# Patient Record
Sex: MALE | Race: WHITE | Employment: OTHER | URBAN - NONMETROPOLITAN AREA
[De-identification: names, ages, dates, MRNs, and addresses within clinical notes are randomized per-mention and may not be internally consistent; named-entity substitution may affect disease eponyms.]

---

## 2019-03-04 ENCOUNTER — HOSPITAL ENCOUNTER (OUTPATIENT)
Age: 54
Discharge: HOME | End: 2019-03-04

## 2019-03-04 LAB
EKG ATRIAL RATE: 93 BPM
EKG P AXIS: 57 DEGREES
EKG P-R INTERVAL: 158 MS
EKG Q-T INTERVAL: 360 MS
EKG QRS DURATION: 92 MS
EKG QTC CALCULATION (BAZETT): 447 MS
EKG R AXIS: 61 DEGREES
EKG T AXIS: 32 DEGREES
EKG VENTRICULAR RATE: 93 BPM

## 2019-03-04 PROCEDURE — 93005 ELECTROCARDIOGRAM TRACING: CPT

## 2019-05-15 ENCOUNTER — OFFICE VISIT (OUTPATIENT)
Dept: CARDIOLOGY | Age: 54
End: 2019-05-15

## 2019-05-15 ENCOUNTER — HOSPITAL ENCOUNTER (OUTPATIENT)
Dept: NON INVASIVE DIAGNOSTICS | Age: 54
Discharge: HOME | End: 2019-05-15

## 2019-05-15 VITALS
HEIGHT: 69 IN | DIASTOLIC BLOOD PRESSURE: 107 MMHG | BODY MASS INDEX: 33.86 KG/M2 | HEART RATE: 79 BPM | SYSTOLIC BLOOD PRESSURE: 207 MMHG | OXYGEN SATURATION: 99 % | RESPIRATION RATE: 17 BRPM | WEIGHT: 228.6 LBS

## 2019-05-15 DIAGNOSIS — E66.9 OBESITY (BMI 30.0-34.9): ICD-10-CM

## 2019-05-15 DIAGNOSIS — I10 ESSENTIAL HYPERTENSION: ICD-10-CM

## 2019-05-15 DIAGNOSIS — G47.33 OSA (OBSTRUCTIVE SLEEP APNEA): ICD-10-CM

## 2019-05-15 DIAGNOSIS — I10 UNCONTROLLED HYPERTENSION: Primary | ICD-10-CM

## 2019-05-15 LAB
LV EF: 65 %
LVEF MODALITY: NORMAL

## 2019-05-15 PROCEDURE — 99243 OFF/OP CNSLTJ NEW/EST LOW 30: CPT | Performed by: INTERNAL MEDICINE

## 2019-05-15 PROCEDURE — 93306 TTE W/DOPPLER COMPLETE: CPT

## 2019-05-15 RX ORDER — CARVEDILOL 12.5 MG/1
12.5 TABLET ORAL 2 TIMES DAILY
Qty: 180 TABLET | Refills: 3 | Status: SHIPPED | OUTPATIENT
Start: 2019-05-15 | End: 2020-07-13 | Stop reason: SDUPTHER

## 2019-05-15 RX ORDER — LISINOPRIL 20 MG/1
20 TABLET ORAL DAILY
COMMUNITY
End: 2019-05-15

## 2019-05-15 RX ORDER — METOPROLOL SUCCINATE 25 MG/1
25 TABLET, EXTENDED RELEASE ORAL DAILY
COMMUNITY
End: 2019-05-15

## 2019-05-15 RX ORDER — LOSARTAN POTASSIUM AND HYDROCHLOROTHIAZIDE 25; 100 MG/1; MG/1
1 TABLET ORAL DAILY
Qty: 90 TABLET | Refills: 3 | Status: SHIPPED | OUTPATIENT
Start: 2019-05-15 | End: 2019-08-23 | Stop reason: ALTCHOICE

## 2019-05-15 RX ORDER — CLONIDINE HYDROCHLORIDE 0.2 MG/1
0.2 TABLET ORAL 3 TIMES DAILY PRN
COMMUNITY
End: 2020-04-27

## 2019-05-15 NOTE — PROGRESS NOTES
Bella Valentine am scribing for and in the presence of Mikhail Garcia MD.    Patient: Mili Romero  : 1965  Date of Visit: May 15, 2019    REASON FOR VISIT / CONSULTATION: New Patient (Referral from TASIA Vernon. HX: HTN. Patient states he feels good. SOB while running up steps. palpitations every once in a while-do not last long. Denies: CP, lightheaded/dizziness)      History of Present Illness:        Dear TERESA Graves CNP    I had the pleasure of seeing Mili Romero in my office today. Mr. [place] is a 47 y.o. male with a history of difficult to control hypertension. He has had hypertension for several years. He started on medications 3-4 months ago. He does have a family history of hypertension in father. History of heart disease in his father. He denies any history of diabetes. He never has been a smoker. Risk factors: hypertension, sleep apnea, obesity. Mr. [place] is here as a referral from TERESA Graves CNP for hypertension. Every once in a while he will feel flutters in his chest.  Denies any chest pain, pressure or tightness. He does have headaches once a while. No change in his vision, no blurring or double vision. He has been trying to lose weight. He does have clinical history suggestive of obstructive sleep apnea syndrome. He has poor sleep, snoring and history of witnessed apneic episodes. 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 He does activities of daily living and he lives by himself. He drinks 1 cup of caffeine a day. He does drink 3-4 nights a week with drinking a couple drinks before bed. He denies any chest pain now or in the recent past, increased shortness of breath, abdominal pain, bleeding problems, problems with his medications or any other concerns at this time. ECG today showed sinus rhythm, no acute ischemic changes. Patient said he is taking clonidine as needed.   He has his blood pressure log and is not as bad as his blood pressure in the office today. Usually running in the 903X systolic with a few readings as low as 1 16 mmHg. PAST MEDICAL HISTORY:      No past medical history on file. CURRENT ALLERGIES: Patient has no known allergies. REVIEW OF SYSTEMS: 14 systems were reviewed. Pertinent positives and negatives as above, all else negative. No past surgical history on file. Social History:  Social History     Tobacco Use    Smoking status: Never Smoker    Smokeless tobacco: Never Used   Substance Use Topics    Alcohol use: Not on file    Drug use: Not on file        CURRENT MEDICATIONS:        Outpatient Medications Marked as Taking for the 5/15/19 encounter (Office Visit) with Shad Enriquez MD   Medication Sig Dispense Refill    cloNIDine (CATAPRES) 0.2 MG tablet Take 0.2 mg by mouth 3 times daily as needed      carvedilol (COREG) 12.5 MG tablet Take 1 tablet by mouth 2 times daily 180 tablet 3    losartan-hydrochlorothiazide (HYZAAR) 100-25 MG per tablet Take 1 tablet by mouth daily 90 tablet 3       FAMILY HISTORY: History of hypertension and his father. His father also has heart disease in his early 62s. Physical Examination:      BP (!) 207/107 (Site: Left Upper Arm, Position: Sitting, Cuff Size: Large Adult)   Pulse 79   Resp 17   Ht 5' 9\" (1.753 m)   Wt 228 lb 9.6 oz (103.7 kg)   SpO2 99%   BMI 33.76 kg/m²  Body mass index is 33.76 kg/m². Constitutional: He is oriented to person. He appears well-developed and well-nourished. In no acute distress. HEENT: Normocephalic and atraumatic. No JVD present. Carotid bruit is not present. No mass and no thyromegaly present. No lymphadenopathy present. Cardiovascular: Normal rate, regular rhythm, normal heart sounds. Exam reveals no gallop and no friction rubs. No murmur was heard. .   Pulmonary/Chest: Effort normal and breath sounds normal. No respiratory distress. He has no wheezes, rhonchi or rales.  Abdominal: Soft, to hold off. Obesity: Body mass index is 33.76 kg/m². I also briefly discussed both diet and exercise strategies for him to continue to loses weight and he was very receptive to this. In the meantime, I encouraged Mr. Gloria Vazquez to continue to take his other medications. FOLLOW UP:   I told Mr. Gloria Vazquez to call my office if he had any problems, but otherwise I asked him to Return in about 2 weeks (around 5/29/2019). However, I would be happy to see him sooner should the need arise. Sincerely,  Edgard Hou MD, F.A.C.C. [facility] Cardiology Specialist    [address] [place] [address]  Phone: [phone], Fax: [phone]     I believe that the risk of significant morbidity and mortality related to the patient's current medical conditions are: Intermediate. 40 minutes were spent with the patient and all of their questions were answered. The documentation recorded by the scribe, accurately and completely reflects the services I personally performed and the decisions made by me. Edgard Hou MD, F.A.C.C.  May 15, 2019

## 2019-05-15 NOTE — PATIENT INSTRUCTIONS
SURVEY:    You may be receiving a survey from [email or web address] regarding your visit today. Please complete the survey to enable us to provide the highest quality of care to you and your family. If you cannot score us a very good on any question, please call the office to discuss how we could have made your experience a very good one. Thank you.

## 2019-05-16 ENCOUNTER — TELEPHONE (OUTPATIENT)
Dept: CARDIOLOGY | Age: 54
End: 2019-05-16

## 2019-05-16 NOTE — TELEPHONE ENCOUNTER
----- Message from Terri Tobias MD sent at 5/15/2019  6:36 PM EDT -----  Normal heart function. We'll discuss more details on follow-up.

## 2019-05-29 ENCOUNTER — OFFICE VISIT (OUTPATIENT)
Dept: CARDIOLOGY | Age: 54
End: 2019-05-29

## 2019-05-29 VITALS
WEIGHT: 218 LBS | HEIGHT: 69 IN | OXYGEN SATURATION: 98 % | BODY MASS INDEX: 32.29 KG/M2 | SYSTOLIC BLOOD PRESSURE: 111 MMHG | HEART RATE: 80 BPM | DIASTOLIC BLOOD PRESSURE: 77 MMHG | RESPIRATION RATE: 18 BRPM

## 2019-05-29 DIAGNOSIS — I10 ESSENTIAL HYPERTENSION: Primary | ICD-10-CM

## 2019-05-29 DIAGNOSIS — E66.9 CLASS 1 OBESITY WITHOUT SERIOUS COMORBIDITY WITH BODY MASS INDEX (BMI) OF 32.0 TO 32.9 IN ADULT, UNSPECIFIED OBESITY TYPE: ICD-10-CM

## 2019-05-29 DIAGNOSIS — G47.33 OSA (OBSTRUCTIVE SLEEP APNEA): ICD-10-CM

## 2019-05-29 PROCEDURE — 99213 OFFICE O/P EST LOW 20 MIN: CPT | Performed by: INTERNAL MEDICINE

## 2019-05-29 NOTE — PATIENT INSTRUCTIONS
SURVEY:    You may be receiving a survey from [employer] regarding your visit today. Please complete the survey to enable us to provide the highest quality of care to you and your family. If you cannot score us a very good on any question, please call the office to discuss how we could have made your experience a very good one. Thank you.

## 2019-05-29 NOTE — PROGRESS NOTES
Tacho Billings am scribing for and in the presence of Digna Loomis MD.    Patient: Ngozi Mccartney  : 1965  Date of Visit: May 29, 2019    REASON FOR VISIT / CONSULTATION: Follow-up (HX: HTN, SALVADOR. Echo 5/15. Pt states he is doing well. Denies: CP, Palpitaitons, Lightheaded/dizziness, SOB. )      History of Present Illness:        Dear TERESA Chaney CNP    I had the pleasure of seeing Ngozi Mccartney in my office today. Mr. Elia Lagos is a 47 y.o. male with a history of difficult to control hypertension. He has had hypertension for several years. He started on medications 3-4 months ago. He does have a family history of hypertension in father. History of heart disease in his father. He denies any history of diabetes. He never has been a smoker. Risk factors: hypertension, sleep apnea, obesity. Mr. Elia Lagos was a referral from TERESA Chaney CNP for hypertension. He does have clinical history suggestive of obstructive sleep apnea syndrome. He has poor sleep, snoring and history of witnessed apneic episodes. He never had sleep apnea test done. 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 He drinks 1 cup of caffeine a day. He does drink 3-4 nights a week with drinking a couple drinks before bed. Echo done on 5/15/2019- EF >65%. The LV wall thickness is moderately increased. The left atrium is mildly dilated (29-33) with a left atrral volume index of 30 ml/m2. Mild aortic root is mildly dilated when corrected for body surface area. Evidence of mild (grade I) diastolic dysfunction is seen. Is here today for follow-up. Doing really well. His blood pressure is controlled. No problems with medication. No chest pain, pressure or tightness. No significant shortness of breath. No palpitations or dizziness. He is doing good activity wise, he also lost 10 pounds since last visit.     PAST MEDICAL HISTORY:        Past Medical History:   Diagnosis Date    H/O echocardiogram 05/15/2019    EF >65%. The LV wall thickness is moderately increased. The left atrium is mildly dilated (29-33) with a left atrral volume index of 30 ml/m2. Mild aortic root is mildly dilated when corrected for body surface area. Evidence of mild (grade I) diastolic dysfunction is seen. CURRENT ALLERGIES: Patient has no known allergies. REVIEW OF SYSTEMS: 14 systems were reviewed. Pertinent positives and negatives as above, all else negative. No past surgical history on file. Social History:  Social History     Tobacco Use    Smoking status: Never Smoker    Smokeless tobacco: Never Used   Substance Use Topics    Alcohol use: Not on file    Drug use: Not on file        CURRENT MEDICATIONS:        Outpatient Medications Marked as Taking for the 5/29/19 encounter (Office Visit) with Bronwyn Mejia MD   Medication Sig Dispense Refill    cloNIDine (CATAPRES) 0.2 MG tablet Take 0.2 mg by mouth 3 times daily as needed      carvedilol (COREG) 12.5 MG tablet Take 1 tablet by mouth 2 times daily 180 tablet 3    losartan-hydrochlorothiazide (HYZAAR) 100-25 MG per tablet Take 1 tablet by mouth daily 90 tablet 3       FAMILY HISTORY: History of hypertension and his father. His father also has heart disease in his early 62s. Physical Examination:      /77 (Site: Right Upper Arm, Position: Sitting, Cuff Size: Medium Adult)   Pulse 80   Resp 18   Ht 5' 9\" (1.753 m)   Wt 218 lb (98.9 kg)   SpO2 98%   BMI 32.19 kg/m²  Body mass index is 32.19 kg/m². Constitutional: He is oriented to person. He appears well-developed and well-nourished. In no acute distress. HEENT: Normocephalic and atraumatic. No JVD present. Carotid bruit is not present. No mass and no thyromegaly present. No lymphadenopathy present. Cardiovascular: Normal rate, regular rhythm, normal heart sounds. Exam reveals no gallop and no friction rubs.  2/6 systolic murmur, 2nd intercostal space on the LEFT just lateral to the sternum. Pulmonary/Chest: Effort normal and breath sounds normal. No respiratory distress. He has no wheezes, rhonchi or rales. Abdominal: Soft, non-tender. Bowel sounds and aorta are normal. He exhibits no organomegaly, mass or bruit. Extremities: None. No cyanosis or clubbing. 2+ radial and carotid pulses. Distal extremity pulses: 2+ bilaterally. .  Neurological: He is alert and oriented to person. No evidence of gross cranial nerve deficit. Coordination appeared normal.   Skin: Skin is warm and dry. There is no rash or diaphoresis. Psychiatric: He has a normal mood and affect. His speech is normal and behavior is normal.      MOST RECENT LABS ON RECORD:   No results found for: WBC, HGB, HCT, PLT, CHOL, TRIG, HDL, LDLDIRECT, ALT, AST, NA, K, CL, CREATININE, BUN, CO2, TSH, PSA, INR, GLUF, LABA1C, LABMICR, BNP    ASSESSMENT:     1. Essential hypertension    2. Class 1 obesity without serious comorbidity with body mass index (BMI) of 32.0 to 32.9 in adult, unspecified obesity type    3. SALVADOR (obstructive sleep apnea)       PLAN:         Essential Hypertension: Controlled   · ACE Inibitor/ARB: Change lisinopril to losartan hydrochlorothiazide 100/25 mg daily. · Beta Blocker Therapy: Continue Carvedilol  12.5 mg  twice daily I discussed the potential side effects of this medication including lightheadedness and dizziness and told him to call the office if this occurs. Obesity: Body mass index is 32.19 kg/m²., Mr. Ho Mayo has lost about 10 lbs since last visit. I also briefly discussed both diet and exercise strategies for him to continue to loses weight and he was very receptive to this. I let him know that when he is ready he can call our office and we can set his sleep study up for him. In the meantime, I encouraged Mr. Ho Mayo to continue to take his other medications.      FOLLOW UP:   I told Mr. Ho Mayo to call my office if he had any problems, but otherwise I asked him to Return in about 1 year (around 5/29/2020). However, I would be happy to see him sooner should the need arise. Sincerely,  Jany James MD, F.A.C.C. [facility] Cardiology Specialist    [address] [place] [address] [facility], [address] [place] [address]  Phone: [phone], Fax: [phone]     I believe that the risk of significant morbidity and mortality related to the patient's current medical conditions are: low-intermediate. The documentation recorded by the scribe, accurately and completely reflects the services I personally performed and the decisions made by me. Jany James MD, F.A.C.C.  May 29, 2019

## 2019-08-14 ENCOUNTER — TELEPHONE (OUTPATIENT)
Dept: CARDIOLOGY | Age: 54
End: 2019-08-14

## 2019-08-14 NOTE — TELEPHONE ENCOUNTER
Robert's daughter, Nolan Todd, called to report Kathy has had low pressure x 1 month along with dizziness. BP is high before taking meds in a.m. Only taking 1 carvedilol daily, not 2 as prescribed. 90/50 is average bp daily     Please advise. Thank you!

## 2019-08-23 ENCOUNTER — OFFICE VISIT (OUTPATIENT)
Dept: CARDIOLOGY | Age: 54
End: 2019-08-23

## 2019-08-23 VITALS — DIASTOLIC BLOOD PRESSURE: 107 MMHG | HEART RATE: 76 BPM | SYSTOLIC BLOOD PRESSURE: 167 MMHG

## 2019-08-23 DIAGNOSIS — I10 ESSENTIAL HYPERTENSION: Primary | ICD-10-CM

## 2019-08-23 PROCEDURE — 99999 PR OFFICE/OUTPT VISIT,PROCEDURE ONLY: CPT | Performed by: INTERNAL MEDICINE

## 2019-08-23 RX ORDER — LISINOPRIL 10 MG/1
10 TABLET ORAL DAILY
COMMUNITY
End: 2019-11-08 | Stop reason: SDUPTHER

## 2019-08-23 NOTE — PATIENT INSTRUCTIONS
SURVEY:    You may be receiving a survey from [employer] regarding your visit today. Please complete the survey to enable us to provide the highest quality of care to you and your family. If you cannot score us a very good on any question, please call the office to discuss how we could have made your experience a very good one. Thank you.

## 2019-11-08 RX ORDER — LISINOPRIL 10 MG/1
10 TABLET ORAL DAILY
Qty: 90 TABLET | Refills: 3 | Status: SHIPPED | OUTPATIENT
Start: 2019-11-08 | End: 2019-11-27 | Stop reason: DRUGHIGH

## 2019-11-27 RX ORDER — LISINOPRIL 20 MG/1
20 TABLET ORAL DAILY
Qty: 90 TABLET | Refills: 3 | Status: SHIPPED | OUTPATIENT
Start: 2019-11-27 | End: 2020-07-13 | Stop reason: SDUPTHER

## 2020-04-27 ENCOUNTER — APPOINTMENT (OUTPATIENT)
Dept: GENERAL RADIOLOGY | Age: 55
End: 2020-04-27

## 2020-04-27 ENCOUNTER — HOSPITAL ENCOUNTER (EMERGENCY)
Age: 55
Discharge: HOME | End: 2020-04-27
Attending: EMERGENCY MEDICINE

## 2020-04-27 VITALS
RESPIRATION RATE: 16 BRPM | HEIGHT: 69 IN | OXYGEN SATURATION: 99 % | BODY MASS INDEX: 29.62 KG/M2 | WEIGHT: 200 LBS | HEART RATE: 92 BPM | SYSTOLIC BLOOD PRESSURE: 199 MMHG | DIASTOLIC BLOOD PRESSURE: 120 MMHG | TEMPERATURE: 98.4 F

## 2020-04-27 PROCEDURE — 73130 X-RAY EXAM OF HAND: CPT

## 2020-04-27 PROCEDURE — 12002 RPR S/N/AX/GEN/TRNK2.6-7.5CM: CPT

## 2020-04-27 PROCEDURE — 90715 TDAP VACCINE 7 YRS/> IM: CPT | Performed by: EMERGENCY MEDICINE

## 2020-04-27 PROCEDURE — 90471 IMMUNIZATION ADMIN: CPT | Performed by: EMERGENCY MEDICINE

## 2020-04-27 PROCEDURE — 2500000003 HC RX 250 WO HCPCS: Performed by: EMERGENCY MEDICINE

## 2020-04-27 PROCEDURE — 99282 EMERGENCY DEPT VISIT SF MDM: CPT

## 2020-04-27 PROCEDURE — 6360000002 HC RX W HCPCS: Performed by: EMERGENCY MEDICINE

## 2020-04-27 RX ORDER — CEPHALEXIN 500 MG/1
500 CAPSULE ORAL 4 TIMES DAILY
Qty: 28 CAPSULE | Refills: 0 | Status: SHIPPED | OUTPATIENT
Start: 2020-04-27 | End: 2020-05-04

## 2020-04-27 RX ORDER — LIDOCAINE HYDROCHLORIDE 10 MG/ML
20 INJECTION, SOLUTION INFILTRATION; PERINEURAL ONCE
Status: COMPLETED | OUTPATIENT
Start: 2020-04-27 | End: 2020-04-27

## 2020-04-27 RX ADMIN — TETANUS TOXOID, REDUCED DIPHTHERIA TOXOID AND ACELLULAR PERTUSSIS VACCINE, ADSORBED 0.5 ML: 5; 2.5; 8; 8; 2.5 SUSPENSION INTRAMUSCULAR at 13:54

## 2020-04-27 RX ADMIN — LIDOCAINE HYDROCHLORIDE 20 ML: 10 INJECTION, SOLUTION INFILTRATION; PERINEURAL at 14:40

## 2020-04-27 ASSESSMENT — PAIN DESCRIPTION - PAIN TYPE: TYPE: ACUTE PAIN

## 2020-04-27 ASSESSMENT — PAIN DESCRIPTION - ORIENTATION: ORIENTATION: LEFT

## 2020-04-27 ASSESSMENT — PAIN DESCRIPTION - LOCATION: LOCATION: HAND

## 2020-04-27 ASSESSMENT — PAIN SCALES - GENERAL
PAINLEVEL_OUTOF10: 8
PAINLEVEL_OUTOF10: 8

## 2020-04-27 NOTE — ED PROVIDER NOTES
Acute Type of Exam: Initial FINDINGS: No acute fracture. No radiopaque foreign body. Narrowing of the interphalangeal joint spaces. No radiopaque foreign body. LABS:  No results found for this visit on 04/27/20.    none    EMERGENCY DEPARTMENT COURSE:   Vitals:    Vitals:    04/27/20 1341   BP: (!) 199/120   Pulse: 92   Resp: 16   Temp: 98.4 °F (36.9 °C)   TempSrc: Oral   SpO2: 99%   Weight: 90.7 kg (200 lb)   Height: 5' 9\" (1.753 m)     -------------------------  BP: (!) 199/120, Temp: 98.4 °F (36.9 °C), Pulse: 92, Resp: 16      RE-EVALUATION:      CONSULTS:  none    PROCEDURES:  Lac Repair  Date/Time: 4/27/2020 3:03 PM  Performed by: Avery Chang MD  Authorized by: Avery Chang MD     Consent:     Consent obtained:  Verbal    Consent given by:  Patient    Risks discussed:  Infection, pain, retained foreign body, need for additional repair, poor cosmetic result and poor wound healing    Alternatives discussed:  No treatment  Anesthesia (see MAR for exact dosages):      Anesthesia method:  Local infiltration    Local anesthetic:  Lidocaine 1% w/o epi  Laceration details:     Location:  Hand    Hand location:  L palm    Length (cm):  3    Depth (mm):  3  Repair type:     Repair type:  Simple  Pre-procedure details:     Preparation:  Patient was prepped and draped in usual sterile fashion  Exploration:     Hemostasis achieved with:  Direct pressure    Wound exploration: wound explored through full range of motion      Wound extent: no foreign bodies/material noted      Contaminated: no    Treatment:     Area cleansed with:  Betadine    Amount of cleaning:  Standard    Irrigation solution:  Sterile saline    Irrigation method:  Syringe    Visualized foreign bodies/material removed: no    Skin repair:     Repair method:  Sutures    Suture size:  3-0    Suture material:  Nylon    Number of sutures:  6  Approximation:     Approximation:  Close  Post-procedure details:     Dressing:  Adhesive bandage and

## 2020-07-13 RX ORDER — CARVEDILOL 12.5 MG/1
12.5 TABLET ORAL 2 TIMES DAILY
Qty: 28 TABLET | Refills: 0 | Status: SHIPPED | OUTPATIENT
Start: 2020-07-13 | End: 2020-07-31 | Stop reason: SDUPTHER

## 2020-07-13 RX ORDER — LISINOPRIL 20 MG/1
20 TABLET ORAL DAILY
Qty: 14 TABLET | Refills: 0 | Status: SHIPPED | OUTPATIENT
Start: 2020-07-13 | End: 2020-07-31 | Stop reason: SDUPTHER

## 2020-07-31 ENCOUNTER — OFFICE VISIT (OUTPATIENT)
Dept: CARDIOLOGY | Age: 55
End: 2020-07-31

## 2020-07-31 ENCOUNTER — HOSPITAL ENCOUNTER (OUTPATIENT)
Age: 55
Discharge: HOME | End: 2020-07-31

## 2020-07-31 VITALS
HEART RATE: 63 BPM | WEIGHT: 229 LBS | DIASTOLIC BLOOD PRESSURE: 127 MMHG | RESPIRATION RATE: 16 BRPM | BODY MASS INDEX: 33.92 KG/M2 | OXYGEN SATURATION: 99 % | SYSTOLIC BLOOD PRESSURE: 216 MMHG | HEIGHT: 69 IN

## 2020-07-31 LAB
ANION GAP SERPL CALCULATED.3IONS-SCNC: 5 MMOL/L (ref 9–17)
BUN BLDV-MCNC: 11 MG/DL (ref 6–20)
BUN/CREAT BLD: 12 (ref 9–20)
CALCIUM SERPL-MCNC: 9.5 MG/DL (ref 8.6–10.4)
CHLORIDE BLD-SCNC: 103 MMOL/L (ref 98–107)
CHOLESTEROL/HDL RATIO: 5.3
CHOLESTEROL: 192 MG/DL
CO2: 28 MMOL/L (ref 20–31)
CREAT SERPL-MCNC: 0.93 MG/DL (ref 0.7–1.2)
GFR AFRICAN AMERICAN: >60 ML/MIN
GFR NON-AFRICAN AMERICAN: >60 ML/MIN
GFR SERPL CREATININE-BSD FRML MDRD: ABNORMAL ML/MIN/{1.73_M2}
GFR SERPL CREATININE-BSD FRML MDRD: ABNORMAL ML/MIN/{1.73_M2}
GLUCOSE BLD-MCNC: 104 MG/DL (ref 70–99)
HCT VFR BLD CALC: 46 % (ref 40.7–50.3)
HDLC SERPL-MCNC: 36 MG/DL
HEMOGLOBIN: 15.4 G/DL (ref 13–17)
LDL CHOLESTEROL: 114 MG/DL (ref 0–130)
MCH RBC QN AUTO: 30.4 PG (ref 25.2–33.5)
MCHC RBC AUTO-ENTMCNC: 33.5 G/DL (ref 28.4–34.8)
MCV RBC AUTO: 90.7 FL (ref 82.6–102.9)
NRBC AUTOMATED: 0 PER 100 WBC
PDW BLD-RTO: 13.4 % (ref 11.8–14.4)
PLATELET # BLD: 195 K/UL (ref 138–453)
PMV BLD AUTO: 9.6 FL (ref 8.1–13.5)
POTASSIUM SERPL-SCNC: 4.7 MMOL/L (ref 3.7–5.3)
RBC # BLD: 5.07 M/UL (ref 4.21–5.77)
SODIUM BLD-SCNC: 136 MMOL/L (ref 135–144)
TRIGL SERPL-MCNC: 209 MG/DL
VLDLC SERPL CALC-MCNC: ABNORMAL MG/DL (ref 1–30)
WBC # BLD: 6.3 K/UL (ref 3.5–11.3)

## 2020-07-31 PROCEDURE — 93010 ELECTROCARDIOGRAM REPORT: CPT | Performed by: INTERNAL MEDICINE

## 2020-07-31 PROCEDURE — 80048 BASIC METABOLIC PNL TOTAL CA: CPT

## 2020-07-31 PROCEDURE — 85027 COMPLETE CBC AUTOMATED: CPT

## 2020-07-31 PROCEDURE — 93005 ELECTROCARDIOGRAM TRACING: CPT | Performed by: INTERNAL MEDICINE

## 2020-07-31 PROCEDURE — 36415 COLL VENOUS BLD VENIPUNCTURE: CPT

## 2020-07-31 PROCEDURE — 99215 OFFICE O/P EST HI 40 MIN: CPT | Performed by: INTERNAL MEDICINE

## 2020-07-31 PROCEDURE — 80061 LIPID PANEL: CPT

## 2020-07-31 RX ORDER — HYDROCHLOROTHIAZIDE 12.5 MG/1
12.5 CAPSULE, GELATIN COATED ORAL EVERY MORNING
Qty: 90 CAPSULE | Refills: 3 | Status: SHIPPED | OUTPATIENT
Start: 2020-07-31 | End: 2021-07-21 | Stop reason: SDUPTHER

## 2020-07-31 RX ORDER — CARVEDILOL 12.5 MG/1
12.5 TABLET ORAL 2 TIMES DAILY
Qty: 180 TABLET | Refills: 1 | Status: SHIPPED | OUTPATIENT
Start: 2020-07-31 | End: 2020-07-31

## 2020-07-31 RX ORDER — CARVEDILOL 25 MG/1
25 TABLET ORAL 2 TIMES DAILY
Qty: 180 TABLET | Refills: 3 | Status: SHIPPED | OUTPATIENT
Start: 2020-07-31 | End: 2021-07-21 | Stop reason: SDUPTHER

## 2020-07-31 RX ORDER — LISINOPRIL 20 MG/1
20 TABLET ORAL DAILY
Qty: 90 TABLET | Refills: 1 | Status: SHIPPED | OUTPATIENT
Start: 2020-07-31 | End: 2020-07-31

## 2020-07-31 RX ORDER — LISINOPRIL 40 MG/1
40 TABLET ORAL DAILY
Qty: 90 TABLET | Refills: 3 | Status: SHIPPED | OUTPATIENT
Start: 2020-07-31 | End: 2021-07-21 | Stop reason: SDUPTHER

## 2020-07-31 NOTE — PATIENT INSTRUCTIONS
SURVEY:    You may be receiving a survey from [employer] regarding your visit today. Please complete the survey to enable us to provide the highest quality of care to you and your family. If you cannot score us a very good on any question, please call the office to discuss how we could have made your experience a very good one. Thank you.

## 2020-07-31 NOTE — PROGRESS NOTES
Alison Ortega am scribing for and in the presence of Shanelle Madrigal MD, F.A.C.C..    Patient: Sammy Teran  : 1965  Date of Visit: 2020    REASON FOR VISIT / CONSULTATION: 1 Year Follow Up (HX: HTN, Obese, SALVADOR. Denies Chest pain or SOB at this time. )      History of Present Illness:        Dear TERESA Bernardo CNP    I had the pleasure of seeing Sammy Teran in my office today. Mr. Randene Hodgkins is a 54 y.o. male with a history of difficult to control hypertension. He has had hypertension for several years. He does have a family history of hypertension in father. History of heart disease in his father. He denies any history of diabetes. He never has been a smoker. Risk factors: hypertension, sleep apnea, obesity. Mr. Randene Hodgkins was a referral from TERESA Bernardo CNP for hypertension. He does have clinical history suggestive of obstructive sleep apnea syndrome. He has poor sleep, snoring and history of witnessed apneic episodes. He never had sleep apnea test done. 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 He does activities of daily living and he lives by himself. He drinks 1 cup of caffeine a day. He does drink 3-4 nights a week with drinking a couple drinks before bed. Echo done on 5/15/2019- EF >65%. The LV wall thickness is moderately increased. The left atrium is mildly dilated (29-33) with a left atrral volume index of 30 ml/m2. Mild aortic root is mildly dilated when corrected for body surface area. Evidence of mild (grade I) diastolic dysfunction is seen. Mr. Randene Hodgkins  Is here today for follow-up. He is doing well cardiac wise. He denies any chest pain, pressure or tightness. No significant shortness of breath. No orthopnea or PND but again is struggling to maintain his sleep because of the sleep apnea syndrome.   His weight is for the 7/31/20 encounter (Office Visit) with Sonal Desai MD   Medication Sig Dispense Refill    lisinopril (PRINIVIL;ZESTRIL) 40 MG tablet Take 1 tablet by mouth daily 90 tablet 3    hydroCHLOROthiazide (MICROZIDE) 12.5 MG capsule Take 1 capsule by mouth every morning 90 capsule 3    carvedilol (COREG) 25 MG tablet Take 1 tablet by mouth 2 times daily 180 tablet 3       FAMILY HISTORY: History of hypertension and his father. His father also has heart disease in his early 62s. Physical Examination:      BP (!) 216/127 (Site: Left Upper Arm, Position: Sitting, Cuff Size: Large Adult)   Pulse 63   Resp 16   Ht 5' 9\" (1.753 m)   Wt 229 lb (103.9 kg)   SpO2 99%   BMI 33.82 kg/m²  Body mass index is 33.82 kg/m². Constitutional: He is oriented to person. He appears well-developed and well-nourished. In no acute distress. HEENT: Normocephalic and atraumatic. No JVD present. Carotid bruit is not present. No mass and no thyromegaly present. No lymphadenopathy present. Cardiovascular: Normal rate, regular rhythm, normal heart sounds. Exam reveals no gallop and no friction rubs. 2/6 systolic murmur, 2nd intercostal space on the LEFT just lateral to the sternum. Pulmonary/Chest: Effort normal and breath sounds normal. No respiratory distress. He has no wheezes, rhonchi or rales. Abdominal: Soft, non-tender. Bowel sounds and aorta are normal. He exhibits no organomegaly, mass or bruit. Extremities: No significant edema. No cyanosis or clubbing. 2+ radial and carotid pulses. Distal extremity pulses: 2+ bilaterally. .  Neurological: He is alert and oriented to person. No evidence of gross cranial nerve deficit. Coordination appeared normal.   Skin: Skin is warm and dry. There is no rash or diaphoresis. Psychiatric: He has a normal mood and affect.  His speech is normal and behavior is normal.      MOST RECENT LABS ON RECORD:   Lab Results   Component Value Date    WBC 6.3 07/31/2020    HGB 15.4 07/31/2020 HCT 46.0 07/31/2020     07/31/2020    CHOL 192 07/31/2020    TRIG 209 (H) 07/31/2020    HDL 36 (L) 07/31/2020     07/31/2020    K 4.7 07/31/2020     07/31/2020    CREATININE 0.93 07/31/2020    BUN 11 07/31/2020    CO2 28 07/31/2020       ASSESSMENT:     1. Hypertensive urgency    2. Class 1 obesity due to excess calories without serious comorbidity with body mass index (BMI) of 33.0 to 33.9 in adult    3. SALVADOR (obstructive sleep apnea)       PLAN:         Hypertensive urgency. · I did explain to the patient that he has hypertensive urgency and he needs to go to the emergency room. · He is absolutely refusing to do this as outlined in HPI. · We kept him in the office for over an hour. Gave him an extra dose of lisinopril 20 mg and kept measuring his blood pressure but his systolic blood pressure remained elevated above 200 mmHg. · I did explain to him that hypertensive urgency can cause heart attack, heart failure, stroke or intracranial bleeding and it is a must to go to the emergency room but patient was adamant about not going to the ER as detailed in HPI. · Finally we decided to go with his decision to adjust his medication and closely follow-up his blood pressure at home. I gave him my cell phone number to call me with the blood pressure tomorrow. · ACE Inibitor/ARB: INCREASE to lisinopril 40 mg daily. I also discussed the potential side effects of this medication including lightheadedness and dizziness and instructed them to stop the medication of this occurs and call our office if this occurs. · Beta Blocker Therapy: Increase Carvedilol  25 mg  twice daily I discussed the potential side effects of this medication including lightheadedness and dizziness and told him to call the office if this occurs. · Diuretics: START Hydrochlorothiazide (HCTZ) 25 mg, 1/2 tab every morning. · I reviewed his blood work and its very much unremarkable.   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 He is under a lot of financial restraint. Obesity: Body mass index is 33.82 kg/m². I also briefly discussed both diet and exercise strategies for him to continue to loses weight and he was very receptive to this.  Suspected Obstructive Sleep Apnea: Given Mr. Franky Doll symptoms of daytime tiredness and not waking up rested in the morning, I advised her to consider undergoing a sleep apnea screening which she agreed to do. Therefore I ordered a Apnea link home screening monitor for her. In the meantime, I encouraged Mr. Ghanshyam Le to continue to take his other medications. FOLLOW UP:   I told Mr. Ghanshyam Le to call my office if he had any problems, but otherwise I asked him to Return in about 1 week (around 8/7/2020) for Follow up. However, I would be happy to see him sooner should the need arise. Sincerely,  Albert Ray MD, F.A.C.C. [facility] Cardiology Specialist    [address] [place] [address]  Phone: [phone], Fax: [phone]     I believe that the risk of significant morbidity and mortality related to the patient's current medical conditions are: high. 40 minutes were spent with the patient and all of their questions were answered. The documentation recorded by the scribe, accurately and completely reflects the services I personally performed and the decisions made by me. Albert Ray MD, F.A.C.C.  August 1, 2020

## 2021-07-21 DIAGNOSIS — E66.09 CLASS 1 OBESITY DUE TO EXCESS CALORIES WITHOUT SERIOUS COMORBIDITY WITH BODY MASS INDEX (BMI) OF 33.0 TO 33.9 IN ADULT: ICD-10-CM

## 2021-07-21 RX ORDER — HYDROCHLOROTHIAZIDE 12.5 MG/1
12.5 CAPSULE, GELATIN COATED ORAL EVERY MORNING
Qty: 14 CAPSULE | Refills: 0 | Status: SHIPPED | OUTPATIENT
Start: 2021-07-21 | End: 2021-08-02 | Stop reason: SDUPTHER

## 2021-07-21 RX ORDER — LISINOPRIL 40 MG/1
40 TABLET ORAL DAILY
Qty: 14 TABLET | Refills: 0 | Status: SHIPPED | OUTPATIENT
Start: 2021-07-21 | End: 2021-08-02 | Stop reason: SDUPTHER

## 2021-07-21 RX ORDER — CARVEDILOL 25 MG/1
25 TABLET ORAL 2 TIMES DAILY
Qty: 28 TABLET | Refills: 0 | Status: SHIPPED | OUTPATIENT
Start: 2021-07-21 | End: 2021-08-02 | Stop reason: SDUPTHER

## 2021-08-02 ENCOUNTER — OFFICE VISIT (OUTPATIENT)
Dept: CARDIOLOGY | Age: 56
End: 2021-08-02

## 2021-08-02 VITALS
HEIGHT: 69 IN | HEART RATE: 70 BPM | WEIGHT: 221 LBS | OXYGEN SATURATION: 98 % | DIASTOLIC BLOOD PRESSURE: 75 MMHG | SYSTOLIC BLOOD PRESSURE: 121 MMHG | RESPIRATION RATE: 18 BRPM | BODY MASS INDEX: 32.73 KG/M2

## 2021-08-02 DIAGNOSIS — E66.09 CLASS 1 OBESITY DUE TO EXCESS CALORIES WITHOUT SERIOUS COMORBIDITY WITH BODY MASS INDEX (BMI) OF 33.0 TO 33.9 IN ADULT: ICD-10-CM

## 2021-08-02 DIAGNOSIS — R94.31 ABNORMAL EKG: ICD-10-CM

## 2021-08-02 DIAGNOSIS — Z01.810 PREOP CARDIOVASCULAR EXAM: ICD-10-CM

## 2021-08-02 DIAGNOSIS — I10 ESSENTIAL HYPERTENSION: Primary | ICD-10-CM

## 2021-08-02 DIAGNOSIS — G47.33 OSA (OBSTRUCTIVE SLEEP APNEA): ICD-10-CM

## 2021-08-02 PROCEDURE — 93005 ELECTROCARDIOGRAM TRACING: CPT | Performed by: INTERNAL MEDICINE

## 2021-08-02 PROCEDURE — 93010 ELECTROCARDIOGRAM REPORT: CPT | Performed by: INTERNAL MEDICINE

## 2021-08-02 PROCEDURE — 99211 OFF/OP EST MAY X REQ PHY/QHP: CPT | Performed by: INTERNAL MEDICINE

## 2021-08-02 PROCEDURE — 99213 OFFICE O/P EST LOW 20 MIN: CPT | Performed by: INTERNAL MEDICINE

## 2021-08-02 RX ORDER — HYDROCHLOROTHIAZIDE 12.5 MG/1
12.5 CAPSULE, GELATIN COATED ORAL EVERY MORNING
Qty: 90 CAPSULE | Refills: 3 | Status: SHIPPED | OUTPATIENT
Start: 2021-08-02

## 2021-08-02 RX ORDER — LISINOPRIL 40 MG/1
40 TABLET ORAL DAILY
Qty: 90 TABLET | Refills: 3 | Status: SHIPPED | OUTPATIENT
Start: 2021-08-02

## 2021-08-02 RX ORDER — CARVEDILOL 25 MG/1
25 TABLET ORAL 2 TIMES DAILY
Qty: 180 TABLET | Refills: 3 | Status: SHIPPED | OUTPATIENT
Start: 2021-08-02

## 2021-08-02 NOTE — PATIENT INSTRUCTIONS
SURVEY:    You may be receiving a survey from [employer] regarding your visit today. Please complete the survey to enable us to provide the highest quality of care to you and your family. If you cannot score us a very good on any question, please call the office to discuss how we could have made your experience a very good one. Thank you. Your MA today was Brice Pineda!

## 2021-08-02 NOTE — PROGRESS NOTES
Maria Esther Rockwell am scribing for and in the presence of Asad Salazar MD, F.A.C.C..    Patient: Edna Chance  : 1965  Date of Visit: 2021    REASON FOR VISIT / CONSULTATION: Follow-up (HX:HTN urgency,Obese,SALVADOR Pt is here for follow up he states he is doing well Denies:CP,SOB,lightheaded/dizziness,palp)      History of Present Illness:        Dear TERESA Duarte CNP    I had the pleasure of seeing Edna Chance in my office today. Mr. Orvel Babinski is a 64 y.o. male with a history of difficult to control hypertension. He has had hypertension for several years. He does have a family history of hypertension in father. History of heart disease in his father. He denies any history of diabetes. He never has been a smoker. Risk factors: hypertension, sleep apnea, obesity. Mr. Orvel Babinski was a referral from TERESA Duarte CNP for hypertension. He does have clinical history suggestive of obstructive sleep apnea syndrome. He has poor sleep, snoring and history of witnessed apneic episodes. He never had sleep apnea test done. We ordered a sleep study for him last year but it was never done. 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 This has been recently interrupted by the COVID[place] restrictions and patient is under extreme financial stress he said. He is finally getting busier. He just came from [place] in [place]. [facility] He does activities of daily living and he lives by himself. He drinks 1 cup of caffeine a day. He does drink 3-4 nights a week with drinking a couple drinks before bed. Echo done on 5/15/2019- EF >65%. The LV wall thickness is moderately increased. The left atrium is mildly dilated (29-33) with a left atrral volume index of 30 ml/m2. Mild aortic root is mildly dilated when corrected for body surface area. Evidence of mild (grade I) diastolic dysfunction is seen. Mr. Orvel Babinski  Is here today for yearly follow-up. He is doing well cardiac wise.   He denies any chest pain, pressure or tightness. No significant shortness of breath. Denies pain in legs or thighs when walking. No orthopnea or PND. He reports his sleeping is better. His weight is stable. He reported no problems with his medications. EKG done today in office 8/2/2021- Normal sinus rhythm. LVH with repolarization abnormalities. PAST MEDICAL HISTORY:        Past Medical History:   Diagnosis Date    H/O echocardiogram 05/15/2019    EF >65%. The LV wall thickness is moderately increased. The left atrium is mildly dilated (29-33) with a left atrral volume index of 30 ml/m2. Mild aortic root is mildly dilated when corrected for body surface area. Evidence of mild (grade I) diastolic dysfunction is seen. CURRENT ALLERGIES: Codeine REVIEW OF SYSTEMS: 14 systems were reviewed. Pertinent positives and negatives as above, all else negative. No past surgical history on file. Social History:  Social History     Tobacco Use    Smoking status: Never Smoker    Smokeless tobacco: Never Used   Substance Use Topics    Alcohol use: Not on file    Drug use: Not on file        CURRENT MEDICATIONS:        Outpatient Medications Marked as Taking for the 8/2/21 encounter (Office Visit) with Alicia Bro MD   Medication Sig Dispense Refill    carvedilol (COREG) 25 MG tablet Take 1 tablet by mouth 2 times daily 180 tablet 3    hydroCHLOROthiazide (MICROZIDE) 12.5 MG capsule Take 1 capsule by mouth every morning 90 capsule 3    lisinopril (PRINIVIL;ZESTRIL) 40 MG tablet Take 1 tablet by mouth daily 90 tablet 3       FAMILY HISTORY: History of hypertension and his father. His father also has heart disease in his early 62s. Physical Examination:      /75 (Site: Left Upper Arm, Position: Sitting, Cuff Size: Large Adult)   Pulse 70   Resp 18   Ht 5' 9\" (1.753 m)   Wt 221 lb (100.2 kg)   SpO2 98%   BMI 32.64 kg/m²  Body mass index is 32.64 kg/m². Constitutional: He is oriented to person.  He appears well-developed and well-nourished. In no acute distress. HEENT: Normocephalic and atraumatic. No JVD present. Carotid bruit is not present. No mass and no thyromegaly present. No lymphadenopathy present. Cardiovascular: Normal rate, regular rhythm, normal heart sounds. Exam reveals no gallop and no friction rubs. 2/6 systolic murmur, 2nd intercostal space on the LEFT just lateral to the sternum. Pulmonary/Chest: Effort normal and breath sounds normal. No respiratory distress. He has no wheezes, rhonchi or rales. Abdominal: Soft, non-tender. Bowel sounds and aorta are normal. He exhibits no organomegaly, mass or bruit. Extremities: No significant edema. No cyanosis or clubbing. 2+ radial and carotid pulses. Distal extremity pulses: 2+ bilaterally. .  Neurological: He is alert and oriented to person. No evidence of gross cranial nerve deficit. Coordination appeared normal.   Skin: Skin is warm and dry. There is no rash or diaphoresis. Psychiatric: He has a normal mood and affect. His speech is normal and behavior is normal.      MOST RECENT LABS ON RECORD:   Lab Results   Component Value Date    WBC 6.3 07/31/2020    HGB 15.4 07/31/2020    HCT 46.0 07/31/2020     07/31/2020    CHOL 192 07/31/2020    TRIG 209 (H) 07/31/2020    HDL 36 (L) 07/31/2020     07/31/2020    K 4.7 07/31/2020     07/31/2020    CREATININE 0.93 07/31/2020    BUN 11 07/31/2020    CO2 28 07/31/2020       ASSESSMENT:     1. Essential hypertension    2. Class 1 obesity due to excess calories without serious comorbidity with body mass index (BMI) of 33.0 to 33.9 in adult    3. SALVADOR (obstructive sleep apnea)    4. Abnormal EKG    5. Preop cardiovascular exam       PLAN:         History of Hypertensive urgency. · Currently doing fine. · Asymptomatic from cardiovascular standpoint. · Fairly active with no significant exertional symptoms. · Denies any chest pain, pressure or tightness.   · No significant shortness of echo to assess left ventricular wall thickness, ejection fraction and wall motion. He also has systolic murmur that need to be reevaluated. Suspect this is caused by tricuspid regurgitation from obstructive sleep apnea syndrome and pulmonary hypertension. · Pre-Op Clearance: Tooth Extraction   · Pre-Operative Risk assessment using 2014 ACC/AHA guidelines   · Emergent procedure No  · Active Cardiac Condition No (decompensated HF, Arrhythmia, MI <3 weeks, severe valve disease)  · Risk Level of Procedure Low Risk (endoscopy, superficial skin, breast, ambulatory, or cataract, etc.)  · Revised Cardiac Risk Index Risk factors: None  · Measurement of Exercise Tolerance before Surgery >4 Yes  · According to the 2014 ACC/AHA pre-operative risk assessment guidelines Keyur Chapman is at low risk for major cardiac complications during a low risk procedure and may continue as planned. Specific medication recommendations are listed below. Medications recommended to continue should be taken with a sip of water even when NPO.  Medical management to reduce perioperative risk:   Additional Recommendations: I would also suggest that he continue his beta blocker throughout the perioperative period.  Additional Testing List: None    In the meantime, I encouraged Mr. Kiesha Schroeder to continue to take his other medications. FOLLOW UP:   I told Mr. Kiesha Schroeder to call my office if he had any problems, but otherwise I asked him to Return in about 1 year (around 8/2/2022). However, I would be happy to see him sooner should the need arise. Sincerely,  Aylin Venegas MD, F.A.C.C. [facility]    [address]  [facility], [address] [place] [address]  Phone: [phone], Fax: [phone]     I believe that the risk of significant morbidity and mortality related to the patient's current medical conditions are: Intermediate.  >30 minutes were spent during prep work, discussion and exam of the patient, and follow up documentation and all of their questions were answered. The documentation recorded by the scribe, accurately and completely reflects the services I personally performed and the decisions made by me. Thomas Monroy MD, F.A.C.C.  August 2, 2021